# Patient Record
(demographics unavailable — no encounter records)

---

## 2025-03-11 NOTE — ASSESSMENT
[FreeTextEntry1] : bp stable steroid cream for exzema check thyroid hld to be checked cpe in Septebmer

## 2025-03-11 NOTE — CURRENT MEDS
[Takes medication as prescribed] : does not take [No] : Did not review medication list for presence of high-risk medications.

## 2025-03-11 NOTE — HISTORY OF PRESENT ILLNESS
[FreeTextEntry1] : follow up  [de-identified] : follow up thyroid  has exzema fingers no chst pain sob nvd or palpitations has been taking all meds  doing well otherwise

## 2025-03-11 NOTE — HEALTH RISK ASSESSMENT
[No] : No [No falls in past year] : Patient reported no falls in the past year [Little interest or pleasure doing things] : 1) Little interest or pleasure doing things [Feeling down, depressed, or hopeless] : 2) Feeling down, depressed, or hopeless [0] : 2) Feeling down, depressed, or hopeless: Not at all (0) [PHQ-2 Negative - No further assessment needed] : PHQ-2 Negative - No further assessment needed [[id number]] : 0 [Never] : Never